# Patient Record
Sex: MALE | Race: WHITE
[De-identification: names, ages, dates, MRNs, and addresses within clinical notes are randomized per-mention and may not be internally consistent; named-entity substitution may affect disease eponyms.]

---

## 2018-08-25 ENCOUNTER — HOSPITAL ENCOUNTER (EMERGENCY)
Dept: HOSPITAL 45 - C.EDB | Age: 18
Discharge: HOME | End: 2018-08-25
Payer: OTHER GOVERNMENT

## 2018-08-25 VITALS
HEIGHT: 67.01 IN | WEIGHT: 143.96 LBS | HEIGHT: 67.01 IN | BODY MASS INDEX: 22.6 KG/M2 | BODY MASS INDEX: 22.6 KG/M2 | WEIGHT: 143.96 LBS

## 2018-08-25 VITALS — SYSTOLIC BLOOD PRESSURE: 118 MMHG | HEART RATE: 69 BPM | DIASTOLIC BLOOD PRESSURE: 58 MMHG | OXYGEN SATURATION: 100 %

## 2018-08-25 VITALS — TEMPERATURE: 98.78 F

## 2018-08-25 DIAGNOSIS — N50.811: Primary | ICD-10-CM

## 2018-08-25 NOTE — EMERGENCY ROOM VISIT NOTE
History


Report prepared by Jarvis:  Sujatha Ponce


Under the Supervision of:  Dr. Edgard Oneil M.D.


First contact with patient:  12:11


Chief Complaint:  TESTICULAR PAIN


Stated Complaint:  TESTICULAR PAIN


Nursing Triage Summary:  


Patient c/ of right testicular pain x 2 hrs PTA.





History of Present Illness


The patient is a 18 year old male who presents to the Emergency Room with 

complaints of right testicular pain beginning around 2 hours pta. He states he 

woke up and felt fine, however around 2 hours pta, he suddenly developed 

testicular pain. Pt denies any recent trauma, history of STIs.





   Source of History:  patient


   Onset:  2 hours pta


   Position:  other (right testicle)


   Quality:  other (pain)


   Timing:  other (sudden)


Note:


Negative recent trauma or history of STIs





Review of Systems


See HPI for pertinent positives and negatives.  A total of ten systems were 

reviewed and were otherwise negative.





Past Medical & Surgical


Medical Problems:


(1) No significant past medical history








Family History





No pertinent family history





Social History


Smoking Status:  Never Smoker


Marital Status:  single


Housing Status:  lives with roommate


Occupation Status:  [employer] student





Current/Historical Medications


Scheduled PRN


Ibuprofen (Motrin), 600 MG PO TID PRN for Pain





Physical Exam


Vital Signs











  Date Time  Temp Pulse Resp B/P (MAP) Pulse Ox O2 Delivery O2 Flow Rate FiO2


 


8/25/18 13:52  69 14 118/58 100   


 


8/25/18 12:05 37.1 105 18 132/72 99 Room Air  











Physical Exam


GENERAL: Awake, alert, well-appearing, in no distress


HENT: Normocephalic, atraumatic. Oropharynx unremarkable.


EYES: Normal conjunctiva. Sclera non-icteric.


NECK: Supple. No nuchal rigidity. 


RESPIRATORY: Clear to auscultation. No wheezes. Normal respiratory effort.


CARDIAC: Normal rate.  Normal rhythm. Extremities warm and well perfused.


GI: Soft, non-distended. No tenderness to palpation. No rebound or guarding. No 

masses.


: Mild to moderate right testicular tenderness. No left testicular 

tenderness. No lesions, no hernias appreciated. Bilateral cremasteric reflex 

intact. 


RECTAL: Deferred.


MUSCULOSKELETAL: Atraumatic. Chest examination reveals no tenderness. There is 

no CVA tenderness to palpation. 


LOWER EXTREMITIES: Calves are equal size bilaterally and non-tender. No edema


NEURO: Normal sensorium. No sensory or motor deficits noted. No facial droop.


SKIN: Warm and dry. No rash or jaundice noted.





Medical Decision & Procedures


ER Provider


Diagnostic Interpretation:


Radiology results as stated below per my review and radiologist interpretation: 





TESTICULAR ULTRASOUND





CLINICAL HISTORY: Right testicular pain, acute 2 hrs    





COMPARISON STUDY:  No previous studies for comparison.





FINDINGS: The right testis measures 4.9 x 2 x 3.2 cm. The left testis measures


5.2 x 2.5 x 3.2 cm.





No intratesticular masses are visualized.





There is no evidence of testicular torsion.





There are tiny bilateral epididymal cysts. There are no ultrasound findings to


indicate acute epididymitis.





IMPRESSION:  


1. No acute findings


2. No evidence of intratesticular mass


3. No evidence of testicular torsion 








Electronically signed by:  Anthony Sharma M.D.


8/25/2018 1:15 PM





Laboratory Results











Test


  8/25/18


12:38


 


Urine Color YELLOW 


 


Urine Appearance CLEAR (CLEAR) 


 


Urine pH 6.0 (4.5-7.5) 


 


Urine Specific Gravity


  1.023


(1.000-1.030)


 


Urine Protein NEG (NEG) 


 


Urine Glucose (UA) NEG (NEG) 


 


Urine Ketones NEG (NEG) 


 


Urine Occult Blood NEG (NEG) 


 


Urine Nitrite NEG (NEG) 


 


Urine Bilirubin NEG (NEG) 


 


Urine Urobilinogen NEG (NEG) 


 


Urine Leukocyte Esterase NEG (NEG) 





Laboratory results reviewed by me





Medications Administered











 Medications


  (Trade)  Dose


 Ordered  Sig/Amy


 Route  Start Time


 Stop Time Status Last Admin


Dose Admin


 


 Ibuprofen


  (Motrin Tab)  600 mg  NOW  STAT


 PO  8/25/18 12:17


 8/25/18 12:18 DC 8/25/18 12:36


600 MG











ED Course


1212: The patient was evaluated in room C7. A complete history and physical 

exam was performed.





1217: Ordered Ibuprofen 600 mg PO





1338: I reevaluated the patient. Discussed results and discharge instructions: 

He verbalized understanding and agreement. The patient is ready for discharge.





Medical Decision


Triage Nursing notes reviewed.





Differential diagnosis:


Etiologies such as torsion, mass, infection, hernia, hydrocele, epididymitis, 

trauma, intra-abdominal process, as well as others were entertained.





Otherwise healthy gentleman presents with acute onset of right testicular pain 

approximately 2 hours ago.  Denies trauma.  Denies STI risk.  No lesions.  No 

masses or hernia appreciated.  Testicular ultrasound was completed to evaluate 

for possible torsion although I have lower suspicion for this based on exam.  

Urinalysis was sent.  No evidence of infection on urinalysis and no blood 

noted.  Benign abdomen and doubt referred pain from there or kidney stone.  

Given Motrin for pain.  Ultrasound without evidence of testicular torsion.  No 

acute hydrocele or varicocele noted.  No increased flow consistent with 

epididymitis.  Unsure of exact etiology but again doubt torsion or surgical 

emergency.  Recommend he follow-up as an outpatient and  discussed return 

precautions.  May use Motrin for pain.





Medication Reconcilliation


Current Medication List:  was personally reviewed by me





Blood Pressure Screening


Patient's blood pressure:  Normal blood pressure


Blood pressure disposition:  Did not require urgent referral





Impression





 Primary Impression:  


 Right testicular pain





Scribe Attestation


The scribe's documentation has been prepared under my direction and personally 

reviewed by me in its entirety. I confirm that the note above accurately 

reflects all work, treatment, procedures, and medical decision making performed 

by me.





Departure Information


Dispostion


Home / Self-Care





Prescriptions





Ibuprofen (Motrin) 600 Mg Tab


600 MG PO TID Y for Pain, #15 TAB


   With Food


   Prov: Edgard Oneil M.D.         8/25/18





Forms


HOME CARE DOCUMENTATION FORM,                                                 

               IMPORTANT VISIT INFORMATION, WORK / SCHOOL INSTRUCTIONS





Patient Instructions


My [facility]





Additional Instructions





Please continue to use Motrin as needed for pain.  If you experience 

significant worsening of your pain or new symptoms please return.  Otherwise 

would recommend follow-up within the next 3-5 days with the [facility] 

service if he continue to have symptoms.  If at any time you have significant 

worsening, other changes, or new symptoms please return here for reevaluation.

## 2018-08-25 NOTE — DIAGNOSTIC IMAGING REPORT
TESTICULAR ULTRASOUND



CLINICAL HISTORY: Right testicular pain, acute 2 hrs    



COMPARISON STUDY:  No previous studies for comparison.



FINDINGS: The right testis measures 4.9 x 2 x 3.2 cm. The left testis measures

5.2 x 2.5 x 3.2 cm.



No intratesticular masses are visualized.



There is no evidence of testicular torsion.



There are tiny bilateral epididymal cysts. There are no ultrasound findings to

indicate acute epididymitis.



IMPRESSION:  

1. No acute findings

2. No evidence of intratesticular mass

3. No evidence of testicular torsion 









Electronically signed by:  Anthony Sharma M.D.

8/25/2018 1:15 PM



Dictated Date/Time:  8/25/2018 1:14 PM